# Patient Record
Sex: MALE | Race: WHITE | NOT HISPANIC OR LATINO
[De-identification: names, ages, dates, MRNs, and addresses within clinical notes are randomized per-mention and may not be internally consistent; named-entity substitution may affect disease eponyms.]

---

## 2019-08-28 ENCOUNTER — APPOINTMENT (OUTPATIENT)
Dept: INTERNAL MEDICINE | Facility: CLINIC | Age: 29
End: 2019-08-28
Payer: MEDICAID

## 2019-08-28 VITALS
SYSTOLIC BLOOD PRESSURE: 151 MMHG | HEART RATE: 94 BPM | WEIGHT: 288 LBS | DIASTOLIC BLOOD PRESSURE: 98 MMHG | HEIGHT: 70 IN | BODY MASS INDEX: 41.23 KG/M2

## 2019-08-28 VITALS — DIASTOLIC BLOOD PRESSURE: 94 MMHG | SYSTOLIC BLOOD PRESSURE: 156 MMHG | HEART RATE: 88 BPM

## 2019-08-28 DIAGNOSIS — Z91.89 OTHER SPECIFIED PERSONAL RISK FACTORS, NOT ELSEWHERE CLASSIFIED: ICD-10-CM

## 2019-08-28 DIAGNOSIS — Z86.69 PERSONAL HISTORY OF OTHER DISEASES OF THE NERVOUS SYSTEM AND SENSE ORGANS: ICD-10-CM

## 2019-08-28 DIAGNOSIS — E66.01 MORBID (SEVERE) OBESITY DUE TO EXCESS CALORIES: ICD-10-CM

## 2019-08-28 DIAGNOSIS — I10 ESSENTIAL (PRIMARY) HYPERTENSION: ICD-10-CM

## 2019-08-28 DIAGNOSIS — Z56.9 UNSPECIFIED PROBLEMS RELATED TO EMPLOYMENT: ICD-10-CM

## 2019-08-28 DIAGNOSIS — F99 MENTAL DISORDER, NOT OTHERWISE SPECIFIED: ICD-10-CM

## 2019-08-28 DIAGNOSIS — Z72.3 LACK OF PHYSICAL EXERCISE: ICD-10-CM

## 2019-08-28 PROBLEM — Z00.00 ENCOUNTER FOR PREVENTIVE HEALTH EXAMINATION: Status: ACTIVE | Noted: 2019-08-28

## 2019-08-28 PROCEDURE — G0447 BEHAVIOR COUNSEL OBESITY 15M: CPT

## 2019-08-28 PROCEDURE — 99205 OFFICE O/P NEW HI 60 MIN: CPT | Mod: 25

## 2019-08-28 SDOH — ECONOMIC STABILITY - INCOME SECURITY: UNSPECIFIED PROBLEMS RELATED TO EMPLOYMENT: Z56.9

## 2019-08-30 PROBLEM — F99 PSYCHOLOGICAL DISORDER: Status: ACTIVE | Noted: 2019-08-30

## 2019-08-30 PROBLEM — E66.01 OBESITY, MORBID: Status: ACTIVE | Noted: 2019-08-30

## 2019-09-01 PROBLEM — Z72.3 DOES NOT EXERCISE: Status: ACTIVE | Noted: 2019-09-01

## 2019-09-01 PROBLEM — Z56.9 EMPLOYMENT PROBLEM: Status: ACTIVE | Noted: 2019-09-01

## 2019-09-01 PROBLEM — Z91.89 HISTORY OF WEIGHT CHANGE: Status: ACTIVE | Noted: 2019-08-28

## 2019-09-01 PROBLEM — Z86.69: Status: RESOLVED | Noted: 2019-09-01 | Resolved: 2019-09-01

## 2019-09-01 PROBLEM — I10 BENIGN ESSENTIAL HYPERTENSION: Status: ACTIVE | Noted: 2019-08-30

## 2019-09-01 NOTE — HISTORY OF PRESENT ILLNESS
[de-identified] : This is an unusual case of a 29-year-old insightful man with a history of traumatic weight swings At 5 feet 10 inches, his weight dropped to 119 lbs at age 18, which he attributes to a body image disorder.  I age 20 he was a more normal 155lb. He recalls weighing 470 pounds at age 22, 200 pounds age 24, and 220 pounds age 27.  He had been working in construction, states he was consuming 4000  with a steady weight, but then the job ended, he continued to eat in the same way, and his weight ballooned to 284 pounds i in the last 3 years. [FreeTextEntry1] : Initial Internal Medicine Evaluation

## 2019-09-01 NOTE — COUNSELING
[Potential consequences of obesity discussed] : Potential consequences of obesity discussed [Benefits of weight loss discussed] : Benefits of weight loss discussed [Encouraged to maintain food diary] : Encouraged to maintain food diary [Encouraged to increase physical activity] : Encouraged to increase physical activity [Target Wt Loss Goal ___] : Weight Loss Goals: Target weight loss goal [unfilled] lbs [Good understanding] : Patient has a good understanding of disease, goals and obesity follow-up plan [FreeTextEntry4] : 17

## 2019-09-01 NOTE — PLAN
[FreeTextEntry1] : return visit in 2 months to reassess BP and help distinguish between medical and psych bases for weight gain abd possible 2* metabolic effects from the weight gain - then treat accordingly

## 2019-09-01 NOTE — PHYSICAL EXAM
[Normal Outer Ear/Nose] : the outer ears and nose were normal in appearance [Normal Sclera/Conjunctiva] : normal sclera/conjunctiva [Normal TMs] : both tympanic membranes were normal [Normal Oropharynx] : the oropharynx was normal [No Respiratory Distress] : no respiratory distress  [Clear to Auscultation] : lungs were clear to auscultation bilaterally [No Carotid Bruits] : no carotid bruits [No Edema] : there was no peripheral edema [Normal Supraclavicular Nodes] : no supraclavicular lymphadenopathy [No Spinal Tenderness] : no spinal tenderness [No CVA Tenderness] : no CVA  tenderness [No Joint Swelling] : no joint swelling [Grossly Normal Strength/Tone] : grossly normal strength/tone [No Focal Deficits] : no focal deficits [No Skin Lesions] : no skin lesions [Normal Gait] : normal gait [Speech Grossly Normal] : speech grossly normal [Normal] : affect was normal and insight and judgment were intact [Normal Mood] : the mood was normal [de-identified] : cooperative, somewhat anxious, but acute pain or ailment [de-identified] : No adenopathy; thyroid not palpable. [FreeTextEntry1] : deferred [de-identified] : abdominal wall red stiae

## 2019-11-20 ENCOUNTER — APPOINTMENT (OUTPATIENT)
Dept: INTERNAL MEDICINE | Facility: CLINIC | Age: 29
End: 2019-11-20

## 2020-09-05 ENCOUNTER — EMERGENCY (EMERGENCY)
Facility: HOSPITAL | Age: 30
LOS: 1 days | Discharge: HOME | End: 2020-09-05
Attending: EMERGENCY MEDICINE | Admitting: EMERGENCY MEDICINE
Payer: COMMERCIAL

## 2020-09-05 VITALS
SYSTOLIC BLOOD PRESSURE: 155 MMHG | DIASTOLIC BLOOD PRESSURE: 94 MMHG | HEART RATE: 79 BPM | RESPIRATION RATE: 18 BRPM | OXYGEN SATURATION: 97 % | TEMPERATURE: 99 F

## 2020-09-05 VITALS
OXYGEN SATURATION: 98 % | HEART RATE: 118 BPM | TEMPERATURE: 98 F | HEIGHT: 70 IN | DIASTOLIC BLOOD PRESSURE: 116 MMHG | RESPIRATION RATE: 19 BRPM | WEIGHT: 279.99 LBS | SYSTOLIC BLOOD PRESSURE: 155 MMHG

## 2020-09-05 DIAGNOSIS — R04.0 EPISTAXIS: ICD-10-CM

## 2020-09-05 DIAGNOSIS — R11.10 VOMITING, UNSPECIFIED: ICD-10-CM

## 2020-09-05 LAB
ALBUMIN SERPL ELPH-MCNC: 4.3 G/DL — SIGNIFICANT CHANGE UP (ref 3.3–5)
ALP SERPL-CCNC: 101 U/L — SIGNIFICANT CHANGE UP (ref 40–120)
ALT FLD-CCNC: 24 U/L — SIGNIFICANT CHANGE UP (ref 10–45)
ANION GAP SERPL CALC-SCNC: 14 MMOL/L — SIGNIFICANT CHANGE UP (ref 5–17)
APTT BLD: 27.2 SEC — LOW (ref 27.5–35.5)
AST SERPL-CCNC: 16 U/L — SIGNIFICANT CHANGE UP (ref 10–40)
BASOPHILS # BLD AUTO: 0.04 K/UL — SIGNIFICANT CHANGE UP (ref 0–0.2)
BASOPHILS NFR BLD AUTO: 0.2 % — SIGNIFICANT CHANGE UP (ref 0–2)
BILIRUB SERPL-MCNC: <0.2 MG/DL — SIGNIFICANT CHANGE UP (ref 0.2–1.2)
BLD GP AB SCN SERPL QL: NEGATIVE — SIGNIFICANT CHANGE UP
BUN SERPL-MCNC: 14 MG/DL — SIGNIFICANT CHANGE UP (ref 7–23)
CALCIUM SERPL-MCNC: 8.9 MG/DL — SIGNIFICANT CHANGE UP (ref 8.4–10.5)
CHLORIDE SERPL-SCNC: 99 MMOL/L — SIGNIFICANT CHANGE UP (ref 96–108)
CO2 SERPL-SCNC: 25 MMOL/L — SIGNIFICANT CHANGE UP (ref 22–31)
CREAT SERPL-MCNC: 0.91 MG/DL — SIGNIFICANT CHANGE UP (ref 0.5–1.3)
EOSINOPHIL # BLD AUTO: 0.36 K/UL — SIGNIFICANT CHANGE UP (ref 0–0.5)
EOSINOPHIL NFR BLD AUTO: 2.2 % — SIGNIFICANT CHANGE UP (ref 0–6)
GLUCOSE SERPL-MCNC: 228 MG/DL — HIGH (ref 70–99)
HCT VFR BLD CALC: 39.7 % — SIGNIFICANT CHANGE UP (ref 39–50)
HGB BLD-MCNC: 13.5 G/DL — SIGNIFICANT CHANGE UP (ref 13–17)
IMM GRANULOCYTES NFR BLD AUTO: 0.5 % — SIGNIFICANT CHANGE UP (ref 0–1.5)
INR BLD: 0.98 — SIGNIFICANT CHANGE UP (ref 0.88–1.16)
LYMPHOCYTES # BLD AUTO: 29.2 % — SIGNIFICANT CHANGE UP (ref 13–44)
LYMPHOCYTES # BLD AUTO: 4.73 K/UL — HIGH (ref 1–3.3)
MCHC RBC-ENTMCNC: 29 PG — SIGNIFICANT CHANGE UP (ref 27–34)
MCHC RBC-ENTMCNC: 34 GM/DL — SIGNIFICANT CHANGE UP (ref 32–36)
MCV RBC AUTO: 85.4 FL — SIGNIFICANT CHANGE UP (ref 80–100)
MONOCYTES # BLD AUTO: 1 K/UL — HIGH (ref 0–0.9)
MONOCYTES NFR BLD AUTO: 6.2 % — SIGNIFICANT CHANGE UP (ref 2–14)
NEUTROPHILS # BLD AUTO: 10 K/UL — HIGH (ref 1.8–7.4)
NEUTROPHILS NFR BLD AUTO: 61.7 % — SIGNIFICANT CHANGE UP (ref 43–77)
NRBC # BLD: 0 /100 WBCS — SIGNIFICANT CHANGE UP (ref 0–0)
PLATELET # BLD AUTO: 394 K/UL — SIGNIFICANT CHANGE UP (ref 150–400)
POTASSIUM SERPL-MCNC: 3.2 MMOL/L — LOW (ref 3.5–5.3)
POTASSIUM SERPL-SCNC: 3.2 MMOL/L — LOW (ref 3.5–5.3)
PROT SERPL-MCNC: 7.2 G/DL — SIGNIFICANT CHANGE UP (ref 6–8.3)
PROTHROM AB SERPL-ACNC: 11.8 SEC — SIGNIFICANT CHANGE UP (ref 10.6–13.6)
RBC # BLD: 4.65 M/UL — SIGNIFICANT CHANGE UP (ref 4.2–5.8)
RBC # FLD: 12.4 % — SIGNIFICANT CHANGE UP (ref 10.3–14.5)
RH IG SCN BLD-IMP: POSITIVE — SIGNIFICANT CHANGE UP
RH IG SCN BLD-IMP: POSITIVE — SIGNIFICANT CHANGE UP
SODIUM SERPL-SCNC: 138 MMOL/L — SIGNIFICANT CHANGE UP (ref 135–145)
WBC # BLD: 16.21 K/UL — HIGH (ref 3.8–10.5)
WBC # FLD AUTO: 16.21 K/UL — HIGH (ref 3.8–10.5)

## 2020-09-05 PROCEDURE — 85730 THROMBOPLASTIN TIME PARTIAL: CPT

## 2020-09-05 PROCEDURE — 86850 RBC ANTIBODY SCREEN: CPT

## 2020-09-05 PROCEDURE — 99284 EMERGENCY DEPT VISIT MOD MDM: CPT

## 2020-09-05 PROCEDURE — 85025 COMPLETE CBC W/AUTO DIFF WBC: CPT

## 2020-09-05 PROCEDURE — 36415 COLL VENOUS BLD VENIPUNCTURE: CPT

## 2020-09-05 PROCEDURE — 85610 PROTHROMBIN TIME: CPT

## 2020-09-05 PROCEDURE — 83036 HEMOGLOBIN GLYCOSYLATED A1C: CPT

## 2020-09-05 PROCEDURE — 86901 BLOOD TYPING SEROLOGIC RH(D): CPT

## 2020-09-05 PROCEDURE — 80053 COMPREHEN METABOLIC PANEL: CPT

## 2020-09-05 PROCEDURE — 99283 EMERGENCY DEPT VISIT LOW MDM: CPT | Mod: 25

## 2020-09-05 RX ORDER — POTASSIUM CHLORIDE 20 MEQ
40 PACKET (EA) ORAL ONCE
Refills: 0 | Status: COMPLETED | OUTPATIENT
Start: 2020-09-05 | End: 2020-09-05

## 2020-09-05 RX ORDER — SODIUM CHLORIDE 9 MG/ML
1000 INJECTION INTRAMUSCULAR; INTRAVENOUS; SUBCUTANEOUS ONCE
Refills: 0 | Status: COMPLETED | OUTPATIENT
Start: 2020-09-05 | End: 2020-09-05

## 2020-09-05 RX ADMIN — Medication 40 MILLIEQUIVALENT(S): at 09:36

## 2020-09-05 RX ADMIN — SODIUM CHLORIDE 1000 MILLILITER(S): 9 INJECTION INTRAMUSCULAR; INTRAVENOUS; SUBCUTANEOUS at 07:57

## 2020-09-05 NOTE — ED PROVIDER NOTE - OBJECTIVE STATEMENT
31 y/o M presents to the ED c/o nose bleed since today. Pt dropped his phone in the middle of the night, and upon retrieving it, he fell forward and hit his nose on the floor, While 29 y/o M presents to the ED c/o nose bleed since today. Pt dropped his phone in the middle of the night/morning, and upon retrieving it, he fell forward and hit his nose on the floor. While being triaged here, his nose was actively bleeding but pt was not actively trying to stop the nose bleed. Pt also reports vomiting, secondary to swallowing his blood. Denies Hx nose bleeds, AC use, LOC, other pain, FHx coagulation disorders.

## 2020-09-05 NOTE — ED PROVIDER NOTE - CLINICAL SUMMARY MEDICAL DECISION MAKING FREE TEXT BOX
29 y/o M p/w epistaxis secondary to trauma w/ possible facial fx. Pressure held and Afrin spray given. Bleeding stopped after 20min. Blood work checked and pt w/ normal Hb levels. Finger stick 220, possibly secondary to eating icecream PTA. Blood work a few weeks ago was benign. Given strict return precautions. 29 y/o M p/w epistaxis secondary to trauma w/ possible facial fx. Pressure held and Afrin spray given. Bleeding stopped after 20min. Blood work checked and pt w/ normal Hb levels. Finger stick 220, possibly secondary to eating ice cream PTA. Blood work a few weeks ago was benign. Given strict return precautions.

## 2020-09-05 NOTE — ED ADULT NURSE NOTE - CHPI ED NUR SYMPTOMS NEG
Addiction Progress Note      Subjective  He is calm, denies sweats or tremors. He missed a court date today and requested that we fax a letter stating that he is inpatient to the court, which was completed.  He has some swelling to his right hand from an IV site which is being treated.  He would benefit from Naltrexone for alcohol cravings. He is not sure if he is interested, claims he will never drink again, but we discussed the need for treatment for this to actually be effective.    Concerningly his girlfriend is also a drinker and it could prove difficult to maintain sobriety in that relationship, advised he attend treatment and AA to help him navigate this.  Tolerating phenobarbital tapering.      Objective    Current Meds  Current Facility-Administered Medications   Medication Dose Route Frequency Provider Last Rate Last Dose   • cephalexin (KEFLEX) capsule 500 mg  500 mg Oral 4 times per day Cassandra Mclaughlin MD       • PHENobarbital (LUMINAL) tablet 16.2 mg  16.2 mg Oral Q12H Rosa Holliday CNP   16.2 mg at 02/11/20 0616   • ibuprofen (MOTRIN) tablet 600 mg  600 mg Oral Q8H PRN Maria E Moran DO       • acetaminophen (TYLENOL) tablet 650 mg  650 mg Oral Q6H PRN Outside Provider   650 mg at 02/10/20 0009   • docusate sodium (COLACE) capsule 100 mg  100 mg Oral Daily Outside Provider   100 mg at 02/11/20 0954   • folic acid (FOLATE) tablet 1 mg  1 mg Oral Daily Outside Provider   1 mg at 02/11/20 0954   • gabapentin (NEURONTIN) capsule 100 mg  100 mg Oral 3 times per day Outside Provider   100 mg at 02/11/20 0615   • LORazepam (ATIVAN) injection 2 mg  2 mg Intravenous Q30 Min PRN Outside Provider   2 mg at 02/09/20 0345   • LORazepam (ATIVAN) tablet 2 mg  2 mg Oral Q1H PRN Outside Provider   2 mg at 02/09/20 1128   • sodium chloride 0.9% infusion   Intravenous Continuous Outside Provider 80 mL/hr at 02/11/20 0600     • thiamine (VITAMIN B1) tablet 100 mg  100 mg Oral Daily Outside Provider   100 mg at 02/11/20  0954   • cholecalciferol (VITAMIN D) tablet 50 mcg  50 mcg Oral Daily Outside Provider   50 mcg at 02/11/20 0953   • enoxaparin (LOVENOX) injection 40 mg  40 mg Subcutaneous Q Evening Cassandra Mclaughlin MD            I/O's    Intake/Output Summary (Last 24 hours) at 2/11/2020 1023  Last data filed at 2/11/2020 0600  Gross per 24 hour   Intake 952.53 ml   Output 2200 ml   Net -1247.47 ml       Last Recorded Vitals    Vital Last Value 24 Hour Range   Temperature 98.1 °F (36.7 °C) (02/11/20 1008) Temp  Min: 98.1 °F (36.7 °C)  Max: 99.9 °F (37.7 °C)   Pulse 74 (02/11/20 1008) Pulse  Min: 74  Max: 101   Respiratory 16 (02/11/20 1008) Resp  Min: 16  Max: 16   Non-Invasive  Blood Pressure 112/76 (02/11/20 1008) BP  Min: 112/76  Max: 143/85   Pulse Oximetry 96 % (02/11/20 1008) SpO2  Min: 94 %  Max: 99 %   Arterial   Blood Pressure   No data recorded          Physical Exam  Constitutional:       Appearance: Normal appearance.   Eyes:      Extraocular Movements: Extraocular movements intact.      Pupils: Pupils are equal, round, and reactive to light.   Cardiovascular:      Rate and Rhythm: Normal rate and regular rhythm.      Pulses: Normal pulses.      Heart sounds: Normal heart sounds.   Abdominal:      General: Abdomen is flat. Bowel sounds are normal.      Palpations: Abdomen is soft.   Musculoskeletal: Normal range of motion.   Skin:     Findings: Erythema (to right hand and wrist ) present.   Neurological:      General: No focal deficit present.      Mental Status: He is alert and oriented to person, place, and time.   Psychiatric:         Mood and Affect: Mood normal.            Labs     Recent Results (from the past 24 hour(s))   Sedimentation Rate Westergren    Collection Time: 02/11/20  9:07 AM   Result Value Ref Range    ESR 19 0 - 20 mm/hr   C Reactive Protein    Collection Time: 02/11/20  9:07 AM   Result Value Ref Range    C-REACTIVE PROTEIN 2.2 (H) <1.0 mg/dL       Imaging    XR WRIST 2 VIEWS RIGHT    (Results  Pending)          Assessment and Plan  Alcohol dependence with withdrawal (CMS/HCC)  -Ativan 2 mg PRN for CIWA >/=5 or SBP >150 HR >110  -CIWA scale assessments  -He is recommended to go to residential addiction treatment (refusing)  -Decrease phenobarbital to 16.2 mg  Once daily  scheduled, hold for somnolence  -He is not interested in Naltrexone at this time       Gait instability  -walking and possible PT eval today         PCP  DO Rosa Rich APN-BC  Please [employer] with questions         no numbness/no bleeding gums/no chills/no fever/no loss of consciousness/no syncope/no weakness

## 2020-09-05 NOTE — ED PROVIDER NOTE - NSFOLLOWUPINSTRUCTIONS_ED_ALL_ED_FT
If nose oozes later, use afrin spray and apply pressure for 15 minutes.      If there is large amount of bleeding, return to ED.    Follow up with your PMD regarding glucose.    Nosebleed, Adult  A nosebleed is when blood comes out of the nose. Nosebleeds are common. Usually, they are not a sign of a serious condition.  Nosebleeds can happen if a small blood vessel in your nose starts to bleed or if the lining of your nose (mucous membrane) cracks. They are commonly caused by:  Allergies.Colds.Picking your nose.Blowing your nose too hard.An injury from sticking an object into your nose or getting hit in the nose.Dry or cold air.Less common causes of nosebleeds include:  Toxic fumes.Something abnormal in the nose or in the air-filled spaces in the bones of the face (sinuses).Growths in the nose, such as polyps.Medicines or conditions that cause blood to clot slowly.Certain illnesses or procedures that irritate or dry out the nasal passages.Follow these instructions at home:  When you have a nosebleed:        Sit down and tilt your head slightly forward.Use a clean towel or tissue to pinch your nostrils under the bony part of your nose. After 10 minutes, let go of your nose and see if bleeding starts again. Do not release pressure before that time. If there is still bleeding, repeat the pinching and holding for 10 minutes until the bleeding stops.Do not place tissues or gauze in the nose to stop bleeding.Avoid lying down and avoid tilting your head backward. That may make blood collect in the throat and cause gagging or coughing.Use a nasal spray decongestant to help with a nosebleed as told by your health care provider.Do not use petroleum jelly or mineral oil in your nose. It can drip into your lungs.After a nosebleed:     Avoid blowing your nose or sniffing for a number of hours.Avoid straining, lifting, or bending at the waist for several days. You may resume other normal activities as you are able.Use saline spray or a humidifier as told by your health care provider.Aspirin and blood thinners make bleeding more likely. If you are prescribed these medicines and you suffer from nosebleeds:  Ask your health care provider if you should stop taking the medicines or if you should adjust the dose.Do not stop taking medicines that your health care provider has recommended unless told by your health care provider.If your nosebleed was caused by dry mucous membranes, use over-the-counter saline nasal spray or gel. This will keep the mucous membranes moist and allow them to heal. If you must use a lubricant:  Choose one that is water-soluble.Use only as much as you need and use it only as often as needed.Do not lie down until several hours after you use it.Contact a health care provider if:  You have a fever.You get nosebleeds often or more often than usual.You bruise very easily.You have a nosebleed from having something stuck in your nose.You have bleeding in your mouth.You vomit or cough up brown material.You have a nosebleed after you start a new medicine.Get help right away if:  You have a nosebleed after a fall or a head injury.Your nosebleed does not go away after 20 minutes.You feel dizzy or weak.You have unusual bleeding from other parts of your body.You have unusual bruising on other parts of your body.You become sweaty.You vomit blood.This information is not intended to replace advice given to you by your health care provider. Make sure you discuss any questions you have with your health care provider.    Document Released: 09/27/2006 Document Revised: 03/19/2019 Document Reviewed: 07/04/2017  Elsevier Patient Education © 2020 [employer]

## 2020-09-05 NOTE — ED ADULT TRIAGE NOTE - CHIEF COMPLAINT QUOTE
pt states "I was in bed and my phone fell. I leaned over to get it and fell on my face and my nose started bleeding" Denies LOC. Denies blood thinner use

## 2020-09-05 NOTE — ED PROVIDER NOTE - PATIENT PORTAL LINK FT
You can access the FollowMyHealth Patient Portal offered by [facility] by registering at the following website: [email or web address]. By joining [employer]’s FollowMyHealth portal, you will also be able to view your health information using other applications (apps) compatible with our system.

## 2020-09-05 NOTE — ED PROVIDER NOTE - PHYSICAL EXAMINATION
VITAL SIGNS: I have reviewed nursing notes and confirm.  CONSTITUTIONAL: Well-developed; well-nourished; Anxious appearing  SKIN:  warm and dry, no acute rash.   HEAD:  normocephalic, atraumatic.  EYES: EOM intact; conjunctiva and sclera clear.  ENT: R anterior nose bleed, oozing blood. No septal hematoma. L nare benign. Blood present in the back of the throat initially.   NECK: Supple; non tender.  CARD: S1, S2 normal; no murmurs, gallops, or rubs. Regular rate and rhythm.   RESP:  Clear to auscultation b/l, no wheezes, rales or rhonchi.  ABD: Normal bowel sounds; soft; non-distended; non-tender; no guarding/ rebound.  EXT: Normal ROM. No clubbing, cyanosis or edema. 2+ pulses to b/l ue/le.  NEURO: Alert, oriented, grossly unremarkable  PSYCH: Cooperative, mood and affect appropriate.

## 2020-09-05 NOTE — ED ADULT NURSE NOTE - OBJECTIVE STATEMENT
pt is 30y male, here for nose bleed starting this morning after pt fell out of bed and hit his face on the floor, pt denies any LOC or any other injuries, states "I didn't even hit my nose that hard", pt is A&Ox3, ambulates with steady gait, moderate amt of bleeding noted from the nose, pt appearing anxious but in NAD

## 2021-12-02 ENCOUNTER — TRANSCRIPTION ENCOUNTER (OUTPATIENT)
Age: 31
End: 2021-12-02

## 2021-12-03 ENCOUNTER — EMERGENCY (EMERGENCY)
Facility: HOSPITAL | Age: 31
LOS: 1 days | Discharge: HOME | End: 2021-12-03
Admitting: EMERGENCY MEDICINE
Payer: COMMERCIAL

## 2021-12-03 VITALS
DIASTOLIC BLOOD PRESSURE: 80 MMHG | SYSTOLIC BLOOD PRESSURE: 148 MMHG | OXYGEN SATURATION: 98 % | HEART RATE: 77 BPM | RESPIRATION RATE: 16 BRPM

## 2021-12-03 VITALS
TEMPERATURE: 98 F | WEIGHT: 229.94 LBS | SYSTOLIC BLOOD PRESSURE: 145 MMHG | OXYGEN SATURATION: 98 % | RESPIRATION RATE: 22 BRPM | HEART RATE: 103 BPM | HEIGHT: 70 IN | DIASTOLIC BLOOD PRESSURE: 92 MMHG

## 2021-12-03 DIAGNOSIS — S61.211A LACERATION WITHOUT FOREIGN BODY OF LEFT INDEX FINGER WITHOUT DAMAGE TO NAIL, INITIAL ENCOUNTER: ICD-10-CM
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
## 2021-12-03 DIAGNOSIS — Z23 ENCOUNTER FOR IMMUNIZATION: ICD-10-CM

## 2021-12-03 PROBLEM — E65 LOCALIZED ADIPOSITY: Chronic | Status: ACTIVE | Noted: 2020-09-06

## 2021-12-03 PROCEDURE — 73140 X-RAY EXAM OF FINGER(S): CPT | Mod: 26,LT

## 2021-12-03 PROCEDURE — 73140 X-RAY EXAM OF FINGER(S): CPT

## 2021-12-03 PROCEDURE — 96365 THER/PROPH/DIAG IV INF INIT: CPT

## 2021-12-03 PROCEDURE — 90715 TDAP VACCINE 7 YRS/> IM: CPT

## 2021-12-03 PROCEDURE — 90471 IMMUNIZATION ADMIN: CPT

## 2021-12-03 PROCEDURE — 99284 EMERGENCY DEPT VISIT MOD MDM: CPT

## 2021-12-03 PROCEDURE — 99284 EMERGENCY DEPT VISIT MOD MDM: CPT | Mod: 25

## 2021-12-03 PROCEDURE — 96375 TX/PRO/DX INJ NEW DRUG ADDON: CPT

## 2021-12-03 RX ORDER — TETANUS TOXOID, REDUCED DIPHTHERIA TOXOID AND ACELLULAR PERTUSSIS VACCINE, ADSORBED 5; 2.5; 8; 8; 2.5 [IU]/.5ML; [IU]/.5ML; UG/.5ML; UG/.5ML; UG/.5ML
0.5 SUSPENSION INTRAMUSCULAR ONCE
Refills: 0 | Status: COMPLETED | OUTPATIENT
Start: 2021-12-03 | End: 2021-12-03

## 2021-12-03 RX ORDER — IBUPROFEN 200 MG
1 TABLET ORAL
Qty: 30 | Refills: 0
Start: 2021-12-03 | End: 2021-12-12

## 2021-12-03 RX ORDER — LIDOCAINE HCL 20 MG/ML
10 VIAL (ML) INJECTION ONCE
Refills: 0 | Status: COMPLETED | OUTPATIENT
Start: 2021-12-03 | End: 2021-12-03

## 2021-12-03 RX ORDER — CEFAZOLIN SODIUM 1 G
1000 VIAL (EA) INJECTION ONCE
Refills: 0 | Status: COMPLETED | OUTPATIENT
Start: 2021-12-03 | End: 2021-12-03

## 2021-12-03 RX ORDER — MORPHINE SULFATE 50 MG/1
4 CAPSULE, EXTENDED RELEASE ORAL ONCE
Refills: 0 | Status: DISCONTINUED | OUTPATIENT
Start: 2021-12-03 | End: 2021-12-03

## 2021-12-03 RX ORDER — KETOROLAC TROMETHAMINE 30 MG/ML
15 SYRINGE (ML) INJECTION ONCE
Refills: 0 | Status: DISCONTINUED | OUTPATIENT
Start: 2021-12-03 | End: 2021-12-03

## 2021-12-03 RX ADMIN — Medication 15 MILLIGRAM(S): at 11:13

## 2021-12-03 RX ADMIN — TETANUS TOXOID, REDUCED DIPHTHERIA TOXOID AND ACELLULAR PERTUSSIS VACCINE, ADSORBED 0.5 MILLILITER(S): 5; 2.5; 8; 8; 2.5 SUSPENSION INTRAMUSCULAR at 07:37

## 2021-12-03 RX ADMIN — Medication 100 MILLIGRAM(S): at 07:30

## 2021-12-03 RX ADMIN — MORPHINE SULFATE 4 MILLIGRAM(S): 50 CAPSULE, EXTENDED RELEASE ORAL at 07:44

## 2021-12-03 RX ADMIN — MORPHINE SULFATE 4 MILLIGRAM(S): 50 CAPSULE, EXTENDED RELEASE ORAL at 07:37

## 2021-12-03 RX ADMIN — Medication 10 MILLILITER(S): at 10:54

## 2021-12-03 RX ADMIN — Medication 1000 MILLIGRAM(S): at 08:00

## 2021-12-03 RX ADMIN — Medication 15 MILLIGRAM(S): at 12:54

## 2021-12-03 NOTE — ED ADULT NURSE NOTE - OBJECTIVE STATEMENT
pt. presents with c/o lt index finger lac, which he reports is self sustained, due to a knife, cms in-tact without deficit, hemostasis present, with dsd in place per triage nurse, denies complaint otherwise, anxious, states sight of blood makes him anxious

## 2021-12-03 NOTE — ED PROVIDER NOTE - NSFOLLOWUPINSTRUCTIONS_ED_ALL_ED_FT
Follow up with Dr Julian within one week.                     Care For Your Stitches    WHAT YOU NEED TO KNOW:    Stitches, or sutures, are used to close cuts and wounds on the skin. Stitches need to be removed after your wound has healed.             DISCHARGE INSTRUCTIONS:    Return to the emergency department if:   •Your stitches come apart.      •Blood soaks through your bandages.      •You suddenly cannot move your injured joint.      •You have sudden numbness around your wound.      •You see red streaks coming from your wound.      Contact your healthcare provider if:   •You have a fever and chills.      •Your wound is red, warm, swollen, or leaking pus.      •There is a bad smell coming from your wound.      •You have increased pain in the wound area.      •You have questions or concerns about your condition or care.      Care for your stitches:   •Protect the stitches. You may need to cover your stitches with a bandage for 24 to 48 hours, or as directed. Do not bump or hit the suture area. This could open the wound. Do not trim or shorten the ends of your stitches. If they rub on your clothing, put a gauze bandage between the stitches and your clothes.      •Clean the area as directed. Carefully wash your wound with soap and water. For mouth and lip wounds, rinse your mouth after meals and at bedtime. Ask your healthcare provider what to use to rinse your mouth. If you have a scalp wound, you may gently wash your hair every 2 days with mild shampoo. Do not use hair products, such as hair spray. Check your wound for signs of infection when you clean it. Signs include redness, swelling, and pus.      •Keep the area dry as directed. Wait 12 to 24 hours after you receive your stitches before you take a shower. Take showers instead of baths. Do not take a bath or swim. Your healthcare provider will give you instructions for bathing with your stitches.      Help your wound heal:   •Elevate your wound. Keep your wound above the level of your heart as often as you can. This will help decrease swelling and pain. Prop the area on pillows or blankets, if possible, to keep it elevated comfortably.      •Limit activity. Do not stretch the skin around your wound. This will help prevent bleeding and swelling.      Follow up with your healthcare provider as directed: You may need to return to have your stitches removed. Write down your questions so you remember to ask them during your visits.        © Copyright [employer] 2021           back to top                          © Copyright [employer] 2021

## 2021-12-03 NOTE — ED PROVIDER NOTE - PATIENT PORTAL LINK FT
You can access the FollowMyHealth Patient Portal offered by [facility] by registering at the following website: [email or web address]. By joining [employer]’s FollowMyHealth portal, you will also be able to view your health information using other applications (apps) compatible with our system.

## 2021-12-03 NOTE — ED ADULT NURSE NOTE - NSIMPLEMENTINTERV_GEN_ALL_ED
Implemented All Universal Safety Interventions:  Register to call system. Call bell, personal items and telephone within reach. Instruct patient to call for assistance. Room bathroom lighting operational. Non-slip footwear when patient is off stretcher. Physically safe environment: no spills, clutter or unnecessary equipment. Stretcher in lowest position, wheels locked, appropriate side rails in place.

## 2021-12-03 NOTE — ED PROVIDER NOTE - CARE PROVIDER_API CALL
Michele Julian)  Plastic Surgery  [address], [address]  [place] [address]  Phone: [phone]  Fax: [phone]  Follow Up Time: 4-6 Days

## 2021-12-03 NOTE — ED PROVIDER NOTE - CLINICAL SUMMARY MEDICAL DECISION MAKING FREE TEXT BOX
lac to left index finger concerning for tendon and nerve injury. antibiotics and pain meds given. td updated. x-ray no acute pathology. ortho consulted but report they are not covering hand today. Dr Julian consulted and will see pt in ED.

## 2021-12-03 NOTE — ED PROVIDER NOTE - PHYSICAL EXAMINATION
4 cm lac to radial aspect of left index finer over PIP joint. unable to extend fully at DIP joint. decrease sensation to radial aspect of distal left index finger. cap refill < 2secs.

## 2021-12-03 NOTE — ED PROVIDER NOTE - OBJECTIVE STATEMENT
32 y/o male with no sig PMHx c/o lac to left index finger x 1 hr. pt states accidentally cut with knife prior to arrival. pt reports sharp  pain and unable to fully extend finger with numbness. no weakness. pt does not recall last td. no further complaints.

## 2021-12-03 NOTE — ED ADULT NURSE NOTE - CADM POA URETHRAL CATHETER
Refill requested: valtrex  Last refill: 10/26/20 for 90 days with 3 refills  Last OV: 11/19/20  Recommended follow-up: none  Next OV: none scheduled        
No

## 2021-12-03 NOTE — ED ADULT NURSE REASSESSMENT NOTE - NS ED NURSE REASSESS COMMENT FT1
evaluation per AUSTIN Dumont, awaiting orders
hand specialist Iesha suturing pt L hand. + syncopal episode. pt laying flat in procedure room. placed on CCM, VS updated. IV NS fluid bolus initiated. PA raul at bedside. medicated with Toradol 15mg IVP for pain. will cont to reassess.
pt. back from XR, nad, father at bedside, assessment on-going, pending further orders
pt. to XR accompanied by SSA
interventions were implemented as noted, daja. well awaiting XR, pt. utd on current poc, with understanding verbalized, assessment on-going

## 2021-12-04 RX ORDER — CEPHALEXIN 500 MG
1 CAPSULE ORAL
Qty: 40 | Refills: 0
Start: 2021-12-04 | End: 2021-12-13

## 2023-08-23 ENCOUNTER — NON-APPOINTMENT (OUTPATIENT)
Age: 33
End: 2023-08-23